# Patient Record
Sex: MALE | Race: WHITE | Employment: STUDENT | URBAN - METROPOLITAN AREA
[De-identification: names, ages, dates, MRNs, and addresses within clinical notes are randomized per-mention and may not be internally consistent; named-entity substitution may affect disease eponyms.]

---

## 2019-02-08 ENCOUNTER — HOSPITAL ENCOUNTER (OUTPATIENT)
Dept: GENERAL RADIOLOGY | Age: 19
Discharge: HOME | End: 2019-02-08
Attending: INTERNAL MEDICINE
Payer: COMMERCIAL

## 2019-02-08 DIAGNOSIS — K80.20 GALLSTONE: ICD-10-CM

## 2019-02-08 PROCEDURE — 74011000255 HC RX REV CODE- 255

## 2019-02-08 PROCEDURE — 74247 XR UPPER GI W KUB AIR CONT: CPT

## 2019-02-08 PROCEDURE — 74011000250 HC RX REV CODE- 250

## 2019-02-08 RX ADMIN — BARIUM SULFATE 135 ML: 980 POWDER, FOR SUSPENSION ORAL at 07:23

## 2019-02-08 RX ADMIN — BARIUM SULFATE 40 G: 960 POWDER, FOR SUSPENSION ORAL at 07:23

## 2019-02-08 RX ADMIN — ANTACID/ANTIFLATULENT 4 G: 380; 550; 10; 10 GRANULE, EFFERVESCENT ORAL at 07:23

## 2019-03-12 ENCOUNTER — OFFICE VISIT (OUTPATIENT)
Dept: SURGERY | Age: 19
End: 2019-03-12

## 2019-03-12 VITALS
TEMPERATURE: 97.9 F | BODY MASS INDEX: 18.42 KG/M2 | SYSTOLIC BLOOD PRESSURE: 110 MMHG | HEIGHT: 72 IN | DIASTOLIC BLOOD PRESSURE: 72 MMHG | HEART RATE: 89 BPM | WEIGHT: 136 LBS | OXYGEN SATURATION: 99 % | RESPIRATION RATE: 18 BRPM

## 2019-03-12 DIAGNOSIS — K80.20 GALLSTONES: Primary | ICD-10-CM

## 2019-03-12 RX ORDER — SODIUM CHLORIDE 0.9 % (FLUSH) 0.9 %
5-40 SYRINGE (ML) INJECTION EVERY 8 HOURS
Status: CANCELLED | OUTPATIENT
Start: 2019-03-12

## 2019-03-12 RX ORDER — SODIUM CHLORIDE 0.9 % (FLUSH) 0.9 %
5-40 SYRINGE (ML) INJECTION AS NEEDED
Status: CANCELLED | OUTPATIENT
Start: 2019-03-12

## 2019-03-12 NOTE — PROGRESS NOTES
HISTORY OF PRESENT ILLNESS  Beckie Daniels is a 20 y.o. male. Abdominal Pain   Associated symptoms include abdominal pain. Review of Systems   Constitutional: Negative. HENT: Negative. Eyes: Negative. Respiratory: Negative. Cardiovascular: Negative. Gastrointestinal: Positive for abdominal pain. Negative for blood in stool, constipation, diarrhea, heartburn, melena, nausea and vomiting. Genitourinary: Negative. Musculoskeletal: Negative. Skin: Negative. Neurological: Negative. Endo/Heme/Allergies: Negative. Psychiatric/Behavioral: Negative.         Physical Exam

## 2019-03-12 NOTE — H&P (VIEW-ONLY)
[facility] General Surgery Subjective: HPI: Patient is a very pleasant 24-year-old male with a past medical history remarkable for spherocytosis. He is referred by Dr. Guanako Webber for evaluation and management of cholelithiasis. The patient is accompanied by a female relationship unknown. She helps to give most of the history. She states that the patient has a hematologist that [address] [place] [address] he sees every year for evaluation of the spleen for possible splenomegaly. He last saw this provider in November. To the best of their knowledge there is no splenomegaly. Patient states that for approximately 2 months he experienced postprandial abdominal pain in the epigastrium and right upper quadrant with eating fatty foods. He has changed his diet to remove the fatty food. The pain is now a discomfort. There are no active problems to display for this patient. No past medical history on file. No past surgical history on file. Family History Problem Relation Age of Onset Eduarda Vicente Bladder Disease Father Social History Tobacco Use  Smoking status: Never Smoker  Smokeless tobacco: Never Used Substance Use Topics  Alcohol use: Not on file Not on File Prior to Admission medications Not on File Review of Systems:   
14 systems were reviewed. The results are as above in the HPI and otherwise negative. Objective:  
 
Vitals:  
 03/12/19 0841 BP: 110/72 Pulse: 89 Resp: 18 Temp: 97.9 °F (36.6 °C) TempSrc: Oral  
SpO2: 99% Weight: 61.7 kg (136 lb) Height: 6' (1.829 m) Physical Exam: 
GENERAL: alert, cooperative, no distress, appears stated age, EYE: conjunctivae/corneas clear. PERRL, EOM's intact.  
THROAT & NECK: normal and no erythema or exudates noted. ,   
LYMPHATIC: Cervical, supraclavicular, and axillary nodes normal. ,  
LUNG: clear to auscultation bilaterally,  
 HEART: regular rate and rhythm, S1, S2 normal, no murmur, click, rub or gallop, ABDOMEN: soft, non-tender. Bowel sounds normal. No masses,  no organomegaly, EXTREMITIES:  extremities normal, atraumatic, no cyanosis or edema, SKIN: Normal., NEUROLOGIC: AOx3. Cranial nerves 2-12 and sensation grossly intact. ,  
 
Data Review:  to be done Mr. Juliet Canavan has a reminder for a \"due or due soon\" health maintenance. I have asked that he contact his primary care provider for follow-up on this health maintenance. Impression: · Patient with gallstones and likely chronic calculus cholecystitis. Plan:  
 
· Single site robot-assisted laparoscopic cholecystectomy · Consent on chart · Preoperative orders written Signed By: Kathryn Harris MD   
 March 12, 2019

## 2019-03-12 NOTE — PROGRESS NOTES
[facility]  General Surgery    Subjective:      HPI: Patient is a very pleasant 62-year-old male with a past medical history remarkable for spherocytosis. He is referred by Dr. Sonia Martínez for evaluation and management of cholelithiasis. The patient is accompanied by a female relationship unknown. She helps to give most of the history. She states that the patient has a hematologist that [address] [place] [address] he sees every year for evaluation of the spleen for possible splenomegaly. He last saw this provider in November. To the best of their knowledge there is no splenomegaly. Patient states that for approximately 2 months he experienced postprandial abdominal pain in the epigastrium and right upper quadrant with eating fatty foods. He has changed his diet to remove the fatty food. The pain is now a discomfort. There are no active problems to display for this patient. No past medical history on file. No past surgical history on file. Family History   Problem Relation Age of Onset    Gall Bladder Disease Father       Social History     Tobacco Use    Smoking status: Never Smoker    Smokeless tobacco: Never Used   Substance Use Topics    Alcohol use: Not on file      Not on File    Prior to Admission medications    Not on File       Review of Systems:    14 systems were reviewed. The results are as above in the HPI and otherwise negative. Objective:     Vitals:    03/12/19 0841   BP: 110/72   Pulse: 89   Resp: 18   Temp: 97.9 °F (36.6 °C)   TempSrc: Oral   SpO2: 99%   Weight: 61.7 kg (136 lb)   Height: 6' (1.829 m)       Physical Exam:  GENERAL: alert, cooperative, no distress, appears stated age,   EYE: conjunctivae/corneas clear. PERRL, EOM's intact.   THROAT & NECK: normal and no erythema or exudates noted. ,    LYMPHATIC: Cervical, supraclavicular, and axillary nodes normal. ,   LUNG: clear to auscultation bilaterally,   HEART: regular rate and rhythm, S1, S2 normal, no murmur, click, rub or gallop,   ABDOMEN: soft, non-tender. Bowel sounds normal. No masses,  no organomegaly,   EXTREMITIES:  extremities normal, atraumatic, no cyanosis or edema,   SKIN: Normal.,   NEUROLOGIC: AOx3. Cranial nerves 2-12 and sensation grossly intact. ,     Data Review:  to be done    Mr. Radhika Rodgers has a reminder for a \"due or due soon\" health maintenance. I have asked that he contact his primary care provider for follow-up on this health maintenance. Impression:     · Patient with gallstones and likely chronic calculus cholecystitis.     Plan:     · Single site robot-assisted laparoscopic cholecystectomy  · Consent on chart  · Preoperative orders written    Signed By: Sonam Cruz MD     March 12, 2019

## 2019-03-14 ENCOUNTER — HOSPITAL ENCOUNTER (OUTPATIENT)
Dept: LAB | Age: 19
End: 2019-03-14
Payer: COMMERCIAL

## 2019-03-14 ENCOUNTER — HOSPITAL ENCOUNTER (OUTPATIENT)
Dept: GENERAL RADIOLOGY | Age: 19
Discharge: HOME | End: 2019-03-14
Payer: COMMERCIAL

## 2019-03-14 ENCOUNTER — HOSPITAL ENCOUNTER (OUTPATIENT)
Dept: PREADMISSION TESTING | Age: 19
Discharge: HOME | End: 2019-03-14
Payer: COMMERCIAL

## 2019-03-14 DIAGNOSIS — K80.20 GALLSTONES: ICD-10-CM

## 2019-03-14 LAB
ANION GAP SERPL CALC-SCNC: 9 MMOL/L (ref 3–18)
BASOPHILS # BLD: 0 K/UL (ref 0–0.06)
BASOPHILS NFR BLD: 0 % (ref 0–3)
BUN SERPL-MCNC: 15 MG/DL (ref 7–18)
BUN/CREAT SERPL: 19 (ref 12–20)
CALCIUM SERPL-MCNC: 9.3 MG/DL (ref 8.5–10.1)
CHLORIDE SERPL-SCNC: 106 MMOL/L (ref 100–108)
CO2 SERPL-SCNC: 27 MMOL/L (ref 21–32)
CREAT SERPL-MCNC: 0.8 MG/DL (ref 0.6–1.3)
DIFFERENTIAL METHOD BLD: ABNORMAL
EOSINOPHIL # BLD: 0 K/UL (ref 0–0.4)
EOSINOPHIL NFR BLD: 0 % (ref 0–5)
ERYTHROCYTE [DISTWIDTH] IN BLOOD BY AUTOMATED COUNT: 18.1 % (ref 11.6–14.5)
GLUCOSE SERPL-MCNC: 82 MG/DL (ref 74–99)
HCT VFR BLD AUTO: 37.6 % (ref 36–48)
HGB BLD-MCNC: 13.2 G/DL (ref 13–16)
LYMPHOCYTES # BLD: 1.8 K/UL (ref 0.8–3.5)
LYMPHOCYTES NFR BLD: 9 % (ref 20–51)
MCH RBC QN AUTO: 30.6 PG (ref 24–34)
MCHC RBC AUTO-ENTMCNC: 35.1 G/DL (ref 31–37)
MCV RBC AUTO: 87 FL (ref 74–97)
MONOCYTES # BLD: 1 K/UL (ref 0–1)
MONOCYTES NFR BLD: 5 % (ref 2–9)
NEUTS BAND NFR BLD MANUAL: 1 % (ref 0–5)
NEUTS SEG # BLD: 17.5 K/UL (ref 1.8–8)
NEUTS SEG NFR BLD: 85 % (ref 42–75)
PLATELET # BLD AUTO: 247 K/UL (ref 135–420)
PLATELET COMMENTS,PCOM: ABNORMAL
PMV BLD AUTO: 9.5 FL (ref 9.2–11.8)
POTASSIUM SERPL-SCNC: 4 MMOL/L (ref 3.5–5.5)
RBC # BLD AUTO: 4.32 M/UL (ref 4.7–5.5)
RBC MORPH BLD: ABNORMAL
SODIUM SERPL-SCNC: 142 MMOL/L (ref 136–145)
WBC # BLD AUTO: 20.3 K/UL (ref 4.6–13.2)

## 2019-03-14 PROCEDURE — 71046 X-RAY EXAM CHEST 2 VIEWS: CPT

## 2019-03-14 PROCEDURE — 80048 BASIC METABOLIC PNL TOTAL CA: CPT

## 2019-03-14 PROCEDURE — 85025 COMPLETE CBC W/AUTO DIFF WBC: CPT

## 2019-03-14 PROCEDURE — 36415 COLL VENOUS BLD VENIPUNCTURE: CPT

## 2019-03-20 ENCOUNTER — ANESTHESIA EVENT (OUTPATIENT)
Dept: SURGERY | Age: 19
End: 2019-03-20
Payer: COMMERCIAL

## 2019-03-21 ENCOUNTER — HOSPITAL ENCOUNTER (OUTPATIENT)
Age: 19
Discharge: HOME | End: 2019-03-21
Attending: SURGERY | Admitting: SURGERY
Payer: COMMERCIAL

## 2019-03-21 ENCOUNTER — ANESTHESIA (OUTPATIENT)
Dept: SURGERY | Age: 19
End: 2019-03-21
Payer: COMMERCIAL

## 2019-03-21 VITALS
DIASTOLIC BLOOD PRESSURE: 63 MMHG | TEMPERATURE: 97.9 F | HEART RATE: 75 BPM | OXYGEN SATURATION: 100 % | HEIGHT: 74 IN | SYSTOLIC BLOOD PRESSURE: 108 MMHG | BODY MASS INDEX: 18.1 KG/M2 | RESPIRATION RATE: 14 BRPM | WEIGHT: 141 LBS

## 2019-03-21 DIAGNOSIS — G89.18 POSTOPERATIVE PAIN: Primary | ICD-10-CM

## 2019-03-21 PROBLEM — K80.20 GALLSTONES: Status: ACTIVE | Noted: 2019-03-21

## 2019-03-21 PROCEDURE — 74011000250 HC RX REV CODE- 250: Performed by: SURGERY

## 2019-03-21 PROCEDURE — 74011250636 HC RX REV CODE- 250/636: Performed by: NURSE ANESTHETIST, CERTIFIED REGISTERED

## 2019-03-21 PROCEDURE — 77030008756 HC TU IRR SUC STRY -B: Performed by: SURGERY

## 2019-03-21 PROCEDURE — 74011000258 HC RX REV CODE- 258

## 2019-03-21 PROCEDURE — 74011000250 HC RX REV CODE- 250

## 2019-03-21 PROCEDURE — 76210000021 HC REC RM PH II 0.5 TO 1 HR: Performed by: SURGERY

## 2019-03-21 PROCEDURE — 77030020782 HC GWN BAIR PAWS FLX 3M -B: Performed by: SURGERY

## 2019-03-21 PROCEDURE — 77030003028 HC SUT VCRL J&J -A: Performed by: SURGERY

## 2019-03-21 PROCEDURE — 76060000035 HC ANESTHESIA 2 TO 2.5 HR: Performed by: SURGERY

## 2019-03-21 PROCEDURE — 76210000016 HC OR PH I REC 1 TO 1.5 HR: Performed by: SURGERY

## 2019-03-21 PROCEDURE — 77030029216 HC PRT SGL SITE DAVINCI INTU -C: Performed by: SURGERY

## 2019-03-21 PROCEDURE — 74011250636 HC RX REV CODE- 250/636

## 2019-03-21 PROCEDURE — 77030031139 HC SUT VCRL2 J&J -A: Performed by: SURGERY

## 2019-03-21 PROCEDURE — 77030018390 HC SPNG HEMSTAT2 J&J -B: Performed by: SURGERY

## 2019-03-21 PROCEDURE — 88304 TISSUE EXAM BY PATHOLOGIST: CPT

## 2019-03-21 PROCEDURE — 77030020703 HC SEAL CANN DISP INTU -B: Performed by: SURGERY

## 2019-03-21 PROCEDURE — 74011000250 HC RX REV CODE- 250: Performed by: NURSE ANESTHETIST, CERTIFIED REGISTERED

## 2019-03-21 PROCEDURE — 77030020268 HC MISC GENERAL SUPPLY: Performed by: SURGERY

## 2019-03-21 PROCEDURE — 77030018836 HC SOL IRR NACL ICUM -A: Performed by: SURGERY

## 2019-03-21 PROCEDURE — 77030002966 HC SUT PDS J&J -A: Performed by: SURGERY

## 2019-03-21 PROCEDURE — 77030013079 HC BLNKT BAIR HGGR 3M -A: Performed by: ANESTHESIOLOGY

## 2019-03-21 PROCEDURE — 77030002933 HC SUT MCRYL J&J -A: Performed by: SURGERY

## 2019-03-21 PROCEDURE — 74011000254 HC RX REV CODE- 254: Performed by: SURGERY

## 2019-03-21 PROCEDURE — 76010000876 HC OR TIME 2 TO 2.5HR INTENSV - TIER 2: Performed by: SURGERY

## 2019-03-21 PROCEDURE — 77030008683 HC TU ET CUF COVD -A: Performed by: ANESTHESIOLOGY

## 2019-03-21 PROCEDURE — 77030010939 HC CLP LIG TELE -B: Performed by: SURGERY

## 2019-03-21 PROCEDURE — 74011250637 HC RX REV CODE- 250/637: Performed by: SURGERY

## 2019-03-21 PROCEDURE — 77030035488 HC SEAL UNIV DISP INTU -C: Performed by: SURGERY

## 2019-03-21 RX ORDER — SODIUM CHLORIDE 0.9 % (FLUSH) 0.9 %
5-40 SYRINGE (ML) INJECTION EVERY 8 HOURS
Status: DISCONTINUED | OUTPATIENT
Start: 2019-03-21 | End: 2019-03-21 | Stop reason: HOSPADM

## 2019-03-21 RX ORDER — OXYCODONE AND ACETAMINOPHEN 5; 325 MG/1; MG/1
1 TABLET ORAL ONCE
Status: COMPLETED | OUTPATIENT
Start: 2019-03-21 | End: 2019-03-21

## 2019-03-21 RX ORDER — PROMETHAZINE HYDROCHLORIDE 25 MG/1
25 TABLET ORAL
Qty: 10 TAB | Refills: 0 | Status: SHIPPED | OUTPATIENT
Start: 2019-03-21

## 2019-03-21 RX ORDER — ONDANSETRON 2 MG/ML
4 INJECTION INTRAMUSCULAR; INTRAVENOUS ONCE
Status: DISCONTINUED | OUTPATIENT
Start: 2019-03-21 | End: 2019-03-21 | Stop reason: HOSPADM

## 2019-03-21 RX ORDER — PROMETHAZINE HYDROCHLORIDE 25 MG/ML
12.5 INJECTION, SOLUTION INTRAMUSCULAR; INTRAVENOUS
Status: COMPLETED | OUTPATIENT
Start: 2019-03-21 | End: 2019-03-21

## 2019-03-21 RX ORDER — LIDOCAINE HYDROCHLORIDE 20 MG/ML
INJECTION, SOLUTION EPIDURAL; INFILTRATION; INTRACAUDAL; PERINEURAL AS NEEDED
Status: DISCONTINUED | OUTPATIENT
Start: 2019-03-21 | End: 2019-03-21 | Stop reason: HOSPADM

## 2019-03-21 RX ORDER — SUCCINYLCHOLINE CHLORIDE 20 MG/ML
INJECTION INTRAMUSCULAR; INTRAVENOUS AS NEEDED
Status: DISCONTINUED | OUTPATIENT
Start: 2019-03-21 | End: 2019-03-21 | Stop reason: HOSPADM

## 2019-03-21 RX ORDER — ROCURONIUM BROMIDE 10 MG/ML
INJECTION, SOLUTION INTRAVENOUS AS NEEDED
Status: DISCONTINUED | OUTPATIENT
Start: 2019-03-21 | End: 2019-03-21 | Stop reason: HOSPADM

## 2019-03-21 RX ORDER — MIDAZOLAM HYDROCHLORIDE 1 MG/ML
INJECTION, SOLUTION INTRAMUSCULAR; INTRAVENOUS AS NEEDED
Status: DISCONTINUED | OUTPATIENT
Start: 2019-03-21 | End: 2019-03-21 | Stop reason: HOSPADM

## 2019-03-21 RX ORDER — PROPOFOL 10 MG/ML
INJECTION, EMULSION INTRAVENOUS AS NEEDED
Status: DISCONTINUED | OUTPATIENT
Start: 2019-03-21 | End: 2019-03-21 | Stop reason: HOSPADM

## 2019-03-21 RX ORDER — ONDANSETRON 2 MG/ML
INJECTION INTRAMUSCULAR; INTRAVENOUS AS NEEDED
Status: DISCONTINUED | OUTPATIENT
Start: 2019-03-21 | End: 2019-03-21 | Stop reason: HOSPADM

## 2019-03-21 RX ORDER — SODIUM CHLORIDE 0.9 % (FLUSH) 0.9 %
5-40 SYRINGE (ML) INJECTION AS NEEDED
Status: DISCONTINUED | OUTPATIENT
Start: 2019-03-21 | End: 2019-03-21 | Stop reason: HOSPADM

## 2019-03-21 RX ORDER — SODIUM CHLORIDE, SODIUM LACTATE, POTASSIUM CHLORIDE, CALCIUM CHLORIDE 600; 310; 30; 20 MG/100ML; MG/100ML; MG/100ML; MG/100ML
75 INJECTION, SOLUTION INTRAVENOUS CONTINUOUS
Status: DISCONTINUED | OUTPATIENT
Start: 2019-03-21 | End: 2019-03-21 | Stop reason: HOSPADM

## 2019-03-21 RX ORDER — HYDROMORPHONE HYDROCHLORIDE 2 MG/ML
0.5 INJECTION, SOLUTION INTRAMUSCULAR; INTRAVENOUS; SUBCUTANEOUS
Status: DISCONTINUED | OUTPATIENT
Start: 2019-03-21 | End: 2019-03-21 | Stop reason: HOSPADM

## 2019-03-21 RX ORDER — LIDOCAINE HYDROCHLORIDE 10 MG/ML
0.1 INJECTION, SOLUTION EPIDURAL; INFILTRATION; INTRACAUDAL; PERINEURAL AS NEEDED
Status: DISCONTINUED | OUTPATIENT
Start: 2019-03-21 | End: 2019-03-21 | Stop reason: HOSPADM

## 2019-03-21 RX ORDER — OXYCODONE AND ACETAMINOPHEN 5; 325 MG/1; MG/1
1 TABLET ORAL ONCE
Status: CANCELLED | OUTPATIENT
Start: 2019-03-21 | End: 2019-03-22

## 2019-03-21 RX ORDER — INSULIN LISPRO 100 [IU]/ML
INJECTION, SOLUTION INTRAVENOUS; SUBCUTANEOUS ONCE
Status: DISCONTINUED | OUTPATIENT
Start: 2019-03-21 | End: 2019-03-21 | Stop reason: HOSPADM

## 2019-03-21 RX ORDER — INDOCYANINE GREEN AND WATER 25 MG
3 KIT INJECTION
Status: COMPLETED | OUTPATIENT
Start: 2019-03-21 | End: 2019-03-21

## 2019-03-21 RX ORDER — KETOROLAC TROMETHAMINE 30 MG/ML
INJECTION, SOLUTION INTRAMUSCULAR; INTRAVENOUS AS NEEDED
Status: DISCONTINUED | OUTPATIENT
Start: 2019-03-21 | End: 2019-03-21 | Stop reason: HOSPADM

## 2019-03-21 RX ORDER — DEXAMETHASONE SODIUM PHOSPHATE 4 MG/ML
INJECTION, SOLUTION INTRA-ARTICULAR; INTRALESIONAL; INTRAMUSCULAR; INTRAVENOUS; SOFT TISSUE AS NEEDED
Status: DISCONTINUED | OUTPATIENT
Start: 2019-03-21 | End: 2019-03-21 | Stop reason: HOSPADM

## 2019-03-21 RX ORDER — FENTANYL CITRATE 50 UG/ML
INJECTION, SOLUTION INTRAMUSCULAR; INTRAVENOUS AS NEEDED
Status: DISCONTINUED | OUTPATIENT
Start: 2019-03-21 | End: 2019-03-21 | Stop reason: HOSPADM

## 2019-03-21 RX ORDER — BUPIVACAINE HYDROCHLORIDE AND EPINEPHRINE 5; 5 MG/ML; UG/ML
INJECTION, SOLUTION EPIDURAL; INTRACAUDAL; PERINEURAL AS NEEDED
Status: DISCONTINUED | OUTPATIENT
Start: 2019-03-21 | End: 2019-03-21 | Stop reason: HOSPADM

## 2019-03-21 RX ORDER — FENTANYL CITRATE 50 UG/ML
25 INJECTION, SOLUTION INTRAMUSCULAR; INTRAVENOUS AS NEEDED
Status: DISCONTINUED | OUTPATIENT
Start: 2019-03-21 | End: 2019-03-21 | Stop reason: HOSPADM

## 2019-03-21 RX ORDER — NEOSTIGMINE METHYLSULFATE 5 MG/5 ML
SYRINGE (ML) INTRAVENOUS AS NEEDED
Status: DISCONTINUED | OUTPATIENT
Start: 2019-03-21 | End: 2019-03-21 | Stop reason: HOSPADM

## 2019-03-21 RX ORDER — DEXMEDETOMIDINE HYDROCHLORIDE 4 UG/ML
INJECTION, SOLUTION INTRAVENOUS AS NEEDED
Status: DISCONTINUED | OUTPATIENT
Start: 2019-03-21 | End: 2019-03-21 | Stop reason: HOSPADM

## 2019-03-21 RX ORDER — GLYCOPYRROLATE 0.2 MG/ML
INJECTION INTRAMUSCULAR; INTRAVENOUS AS NEEDED
Status: DISCONTINUED | OUTPATIENT
Start: 2019-03-21 | End: 2019-03-21 | Stop reason: HOSPADM

## 2019-03-21 RX ORDER — OXYCODONE AND ACETAMINOPHEN 5; 325 MG/1; MG/1
1 TABLET ORAL
Qty: 20 TAB | Refills: 0 | Status: SHIPPED | OUTPATIENT
Start: 2019-03-21 | End: 2019-03-28

## 2019-03-21 RX ADMIN — LIDOCAINE HYDROCHLORIDE 100 MG: 20 INJECTION, SOLUTION EPIDURAL; INFILTRATION; INTRACAUDAL; PERINEURAL at 12:14

## 2019-03-21 RX ADMIN — DEXMEDETOMIDINE HYDROCHLORIDE 8 MCG: 4 INJECTION, SOLUTION INTRAVENOUS at 12:24

## 2019-03-21 RX ADMIN — Medication 3 MG: at 14:00

## 2019-03-21 RX ADMIN — DEXAMETHASONE SODIUM PHOSPHATE 4 MG: 4 INJECTION, SOLUTION INTRA-ARTICULAR; INTRALESIONAL; INTRAMUSCULAR; INTRAVENOUS; SOFT TISSUE at 12:24

## 2019-03-21 RX ADMIN — DEXMEDETOMIDINE HYDROCHLORIDE 4 MCG: 4 INJECTION, SOLUTION INTRAVENOUS at 12:45

## 2019-03-21 RX ADMIN — SUCCINYLCHOLINE CHLORIDE 120 MG: 20 INJECTION INTRAMUSCULAR; INTRAVENOUS at 12:14

## 2019-03-21 RX ADMIN — MIDAZOLAM HYDROCHLORIDE 2 MG: 1 INJECTION, SOLUTION INTRAMUSCULAR; INTRAVENOUS at 12:10

## 2019-03-21 RX ADMIN — CEFOTETAN DISODIUM 2 G: 2 INJECTION, POWDER, FOR SOLUTION INTRAMUSCULAR; INTRAVENOUS at 12:10

## 2019-03-21 RX ADMIN — ROCURONIUM BROMIDE 10 MG: 10 INJECTION, SOLUTION INTRAVENOUS at 13:34

## 2019-03-21 RX ADMIN — FENTANYL CITRATE 100 MCG: 50 INJECTION, SOLUTION INTRAMUSCULAR; INTRAVENOUS at 12:14

## 2019-03-21 RX ADMIN — FAMOTIDINE 20 MG: 10 INJECTION INTRAVENOUS at 11:43

## 2019-03-21 RX ADMIN — ONDANSETRON 4 MG: 2 INJECTION INTRAMUSCULAR; INTRAVENOUS at 12:24

## 2019-03-21 RX ADMIN — DEXMEDETOMIDINE HYDROCHLORIDE 8 MCG: 4 INJECTION, SOLUTION INTRAVENOUS at 12:38

## 2019-03-21 RX ADMIN — GLYCOPYRROLATE 0.4 MG: 0.2 INJECTION INTRAMUSCULAR; INTRAVENOUS at 14:00

## 2019-03-21 RX ADMIN — DEXMEDETOMIDINE HYDROCHLORIDE 4 MCG: 4 INJECTION, SOLUTION INTRAVENOUS at 14:06

## 2019-03-21 RX ADMIN — KETOROLAC TROMETHAMINE 30 MG: 30 INJECTION, SOLUTION INTRAMUSCULAR; INTRAVENOUS at 12:16

## 2019-03-21 RX ADMIN — DEXMEDETOMIDINE HYDROCHLORIDE 8 MCG: 4 INJECTION, SOLUTION INTRAVENOUS at 12:56

## 2019-03-21 RX ADMIN — SODIUM CHLORIDE, SODIUM LACTATE, POTASSIUM CHLORIDE, AND CALCIUM CHLORIDE 75 ML/HR: 600; 310; 30; 20 INJECTION, SOLUTION INTRAVENOUS at 11:33

## 2019-03-21 RX ADMIN — PROMETHAZINE HYDROCHLORIDE 12.5 MG: 25 INJECTION INTRAMUSCULAR; INTRAVENOUS at 14:30

## 2019-03-21 RX ADMIN — ROCURONIUM BROMIDE 20 MG: 10 INJECTION, SOLUTION INTRAVENOUS at 12:27

## 2019-03-21 RX ADMIN — OXYCODONE AND ACETAMINOPHEN 1 TABLET: 5; 325 TABLET ORAL at 16:08

## 2019-03-21 RX ADMIN — DEXMEDETOMIDINE HYDROCHLORIDE 6 MCG: 4 INJECTION, SOLUTION INTRAVENOUS at 12:31

## 2019-03-21 RX ADMIN — INDOCYANINE GREEN AND WATER 25 MG: KIT at 11:32

## 2019-03-21 RX ADMIN — DEXMEDETOMIDINE HYDROCHLORIDE 6 MCG: 4 INJECTION, SOLUTION INTRAVENOUS at 12:26

## 2019-03-21 RX ADMIN — PROPOFOL 80 MG: 10 INJECTION, EMULSION INTRAVENOUS at 12:14

## 2019-03-21 RX ADMIN — DEXMEDETOMIDINE HYDROCHLORIDE 8 MCG: 4 INJECTION, SOLUTION INTRAVENOUS at 12:41

## 2019-03-21 NOTE — OP NOTES
[address] [place] [address]                                [address] [facility], [address]                                 OPERATIVE REPORT    PATIENT:    Jyoti Lord  MRN:           [id number]   DATE:   3/21/2019  BILLIN  ROOM:       [facility]  ATTENDING:   Faisal Powers MD  DICTATING:   Faisal Powers MD      PREOPERATIVE DIAGNOSIS:  Chronic calculus cholecystitis    POSTOPERATIVE DIAGNOSIS: same    PROCEDURES PERFORMED: Single site robotic assisted laparoscopic cholecystectomy    SURGEON: Pierre Cavazos MD    ASSISTANT: Marily Maza SA    ANESTHESIA: General and local 0.50% Marcaine with epinephrine. FINDINGS: cholelithiasis    EBL: 30 mL    Fluids:  600 mL    DESCRIPTION OF PROCEDURE: The patient was identified in the  holding area where consent for single site robot-assisted  laparoscopic cholecystectomy was verified. In the operating room, the  patient was placed under general anesthesia, supine on the OR table. The abdomen was prepped and draped in sterile fashion using  chlorhexidine solution and sterile drapes. The time-out was performed  to ensure correct procedure. Local anesthetic was infiltrated into the  skin and deep dermal tissues in the umbilicus. The 2.5 cm incision was  created through the umbilicus using the 11 blade. The electrocautery  was used to dissect down to expose the fascia. The fascia was opened  using electrocautery. The peritoneum was bluntly opened using the  Salena Jansky clamp. The fascia was opened for the 2.5 cm length of the  incision. The single site trocar was inserted into the incision and seated  against the anterior abdominal wall. The pneumoperitoneum was  obtained using carbon dioxide gas to 15 mmHg. The camera trocar  was then inserted. The site check was then performed to ensure that  this was a surgery that we could do with single site. No  contraindications were visualized.  The endoscopic left trocar was then  inserted and docked. The endoscopic right trocar was then inserted  and docked. The assistant trocar was inserted. The system was  burped to take pressure off of the trocar. The scope was turned to a 30  down. The hook was inserted into the endoscopic left trocar #3 and  the Prograf was introduced into the endoscopic right trocar #1. The  patient had been positioned in reverse Trendelenburg with the right  side up prior to docking the camera. The gallbladder was grasped and  retracted toward the patient's right shoulder to open the triangle of  Calot. The gallbladder was intrahepatic. The dissection was very difficult. The crocodile grasper and the hook were used to dissect the  fatty tissue off of the anterior wall of the gallbladder and allow  visualization of the infundibulum cystic duct junction. Indocyanine  green had been given preoperatively. The Firefly was used to visualize  the cystic duct. The cystic duct was dissected free circumferentially  and clipped and ligated using scissors and the medium-size clips. The  cystic artery was then identified, isolated and clipped and ligated. The  gallbladder was removed from the gallbladder fossa using  electrocautery. A 4 x 8 gauze was used to soak up bile and blood. The No leaking was noted at the clips. No bleeding was  noted at the gallbladder fossa or artery site. The surgical cart was  undocked. The port was removed. A 5 mm Endocatch was used to contain the gallbladder and stones. The 4 x 8 gauze was removed. The fascia was then reapproximated using figure-of-  eight stitches. #1 single-stranded PDS x3. The skin was  reapproximated using 4-0 Monocryl suture. Mastisol, steristrips, gauze and tegaderm. The patient tolerated the procedure very well. DISPOSITION: He was extubated and stable upon transport to the recovery  room.       Libertad Gifford MD, FACS

## 2019-03-21 NOTE — INTERVAL H&P NOTE
H&P Update: Sharath Arreguin was seen and examined. History and physical has been reviewed. The patient has been examined.  There have been no significant clinical changes since the completion of the originally dated History and Physical. 
 
Signed By: Flakita Orantes MD   
 March 21, 2019 11:24 AM

## 2019-03-21 NOTE — ANESTHESIA POSTPROCEDURE EVALUATION
Procedure(s): 
ROBOTIC ASSISTED SINGLE SITE LAPAROSCOPIC CHOLECYSTECTOMY WITH FIREFLY. general 
 
Anesthesia Post Evaluation Multimodal analgesia: multimodal analgesia used between 6 hours prior to anesthesia start to PACU discharge Patient location during evaluation: bedside Patient participation: complete - patient participated Level of consciousness: awake Pain management: adequate Airway patency: patent Anesthetic complications: no 
Cardiovascular status: acceptable Respiratory status: acceptable Hydration status: acceptable Post anesthesia nausea and vomiting:  controlled (Phenergan for N/V) Vitals Value Taken Time /50 3/21/2019  3:21 PM  
Temp 37.1 °C (98.8 °F) 3/21/2019  2:22 PM  
Pulse 69 3/21/2019  3:27 PM  
Resp 16 3/21/2019  3:27 PM  
SpO2 98 % 3/21/2019  3:27 PM  
Vitals shown include unvalidated device data.

## 2019-03-21 NOTE — ADDENDUM NOTE
Addendum  created 03/21/19 1553 by Servando Schuster CRNA Intraprocedure Meds edited, Orders acknowledged in Narrator

## 2019-03-21 NOTE — DISCHARGE INSTRUCTIONS
[facility] Specialists  Temitope Suarez MD, [facility]  General Surgery    Pt may remove the dressing and shower in two days. Allow soap and water to run over the incision. Please apply an ice pack to the umbilicus for 25 minutes three times daily for 7 days. No driving or operating heavy machinery while on narcotic pain medications. No strenuous activity or contact sports for two weeks. No lifting greater than 15 lbs for 2 weeks. Call MD for any redness, swelling, bleeding or pus at the incision. Also call for any nausea, vomiting, increased pain or pain uncontrolled by pain medicine. Low-Fat Diet for Gallbladder Disease: After Your Visit  Your Care Instructions  When you eat, the gallbladder releases bile, which helps you digest the fat in food. If you have an inflamed gallbladder, this may cause pain. A low-fat diet may give your gallbladder a rest so you can start to heal. Your doctor and dietitian can help you make an eating plan that does not irritate your digestive system. Always talk with your doctor or dietitian before you make changes in your diet. Follow-up care is a key part of your treatment and safety. Be sure to make and go to all appointments, and call your doctor if you are having problems. Its also a good idea to know your test results and keep a list of the medicines you take. How can you care for yourself at home? · Eat many small meals and snacks each day instead of three large meals. · Choose lean meats. ¨ Eat no more than 5 to 6½ ounces of meat a day. ¨ Cut off all fat you can see. ¨ Eat chicken and turkey without the skin. ¨ Many types of fish, such as salmon, lake trout, tuna, and herring, provide healthy omega-3 fat. But, avoid fish canned in oil, such as sardines in olive oil. ¨ Bake, broil, or grill meats, fowl, or fish instead of frying them in butter or fat.   · Drink or eat nonfat or low-fat milk, yogurt, cheese, or other milk products each day.  ¨ Read the labels on cheeses, and choose those with less than 5 grams of fat an ounce. ¨ Try fat-free sour cream, cream cheese, or yogurt. ¨ Avoid cream soups and cream sauces on pasta. ¨ Eat low-fat ice cream, frozen yogurt, or sorbet. Avoid regular ice cream.  · Eat whole-grain cereals, breads, crackers, rice, or pasta. Avoid high-fat foods such as croissants, scones, biscuits, waffles, doughnuts, muffins, granola, and high-fat breads. · Flavor your foods with herbs and spices (such as basil, tarragon, or mint), fat-free sauces, or lemon juice instead of butter. You can also use butter substitutes, fat-free mayonnaise, or fat-free dressing. · Try applesauce, prune puree, or mashed bananas to replace some or all of the fat when you bake. · Limit fats and oils, such as butter, margarine, mayonnaise, and salad dressing, to no more than 1 tablespoon a meal.  · Avoid high-fat foods, such as:  ¨ Chocolate, whole milk, ice cream, processed cheese, and egg yolks. ¨ Fried or buttered foods. ¨ Ham, salami, and wade. ¨ Cinnamon rolls, cakes, pies, cookies, and other pastries. ¨ Prepared snack foods, such as potato chips, nut and granola bars, and mixed nuts. ¨ Coconut and avocado. · Learn how to read food labels for serving sizes and ingredients. Fast-food and convenience-food meals often have lots of fat. Where can you learn more? Go to VerticalResponse.be  Enter A203 in the search box to learn more about \"Low-Fat Diet for Gallbladder Disease: After Your Visit. \"   © 6533-5813 [employer]. Care instructions adapted under license by [facility] (which disclaims liability or warranty for this information).  This care instruction is for use with your licensed healthcare professional. If you have questions about a medical condition or this instruction, always ask your healthcare professional. [place] [address] any warranty or liability for your use of this information. Content Version: 4.9.363829; Last Revised: August 31, 2011    Cholecystectomy: What to Expect at [facility]  After your surgery, it is normal to feel weak and tired for several days after you return home. Your belly may be swollen. If you had laparoscopic surgery, you may also have pain in your shoulder for about 24 hours. You may have gas or need to burp a lot at first, and a few people get diarrhea. The diarrhea usually goes away in 2 to 4 weeks, but it may last longer. How quickly you recover depends on whether you had a laparoscopic or open surgery. · For a laparoscopic surgery, most people can go back to work or their normal routine in 1 to 2 weeks, but it may take longer, depending on the type of work you do. · For an open surgery, it will probably take 4 to 6 weeks before you get back to your normal routine. This care sheet gives you a general idea about how long it will take for you to recover. However, each person recovers at a different pace. Follow the steps below to get better as quickly as possible. How can you care for yourself at home? Activity    · Rest when you feel tired. Getting enough sleep will help you recover.     · Try to walk each day. Start out by walking a little more than you did the day before. Gradually increase the amount you walk. Walking boosts blood flow and helps prevent pneumonia and constipation.     · For about 2 to 4 weeks, avoid lifting anything that would make you strain. This may include a child, heavy grocery bags and milk containers, a heavy briefcase or backpack, cat litter or dog food bags, or a vacuum .     · Avoid strenuous activities, such as biking, jogging, weightlifting, and aerobic exercise, until your doctor says it is okay.     · You may shower 24 to 48 hours after surgery, if your doctor okays it. Pat the cut (incision) dry.  Do not take a bath for the first 2 weeks, or until your doctor tells you it is okay.     · You may drive when you are no longer taking pain medicine and can quickly move your foot from the gas pedal to the brake. You must also be able to sit comfortably for a long period of time, even if you do not plan to go far. You might get caught in traffic.     · For a laparoscopic surgery, most people can go back to work or their normal routine in 1 to 2 weeks, but it may take longer. For an open surgery, it will probably take 4 to 6 weeks before you get back to your normal routine.     · Your doctor will tell you when you can have sex again.    Diet    · Eat smaller meals more often instead of fewer larger meals. You can eat a normal diet, but avoid eating fatty foods for about 1 month. Fatty foods include hamburger, whole milk, cheese, and many snack foods. If your stomach is upset, try bland, low-fat foods like plain rice, broiled chicken, toast, and yogurt.     · Drink plenty of fluids (unless your doctor tells you not to).   · If you have diarrhea, try avoiding spicy foods, dairy products, fatty foods, and alcohol. You can also watch to see if specific foods cause it, and stop eating them. If the diarrhea continues for more than 2 weeks, talk to your doctor.     · You may notice that your bowel movements are not regular right after your surgery. This is common. Try to avoid constipation and straining with bowel movements. You may want to take a fiber supplement every day. If you have not had a bowel movement after a couple of days, ask your doctor about taking a mild laxative. Medicines    · Your doctor will tell you if and when you can restart your medicines. He or she will also give you instructions about taking any new medicines.     · If you take blood thinners, such as warfarin (Coumadin), clopidogrel (Plavix), or aspirin, be sure to talk to your doctor. He or she will tell you if and when to start taking those medicines again.  Make sure that you understand exactly what your doctor wants you to do.     · Take pain medicines exactly as directed. ? If the doctor gave you a prescription medicine for pain, take it as prescribed. ? If you are not taking a prescription pain medicine, take an over-the-counter medicine such as acetaminophen (Tylenol), ibuprofen (Advil, Motrin), or naproxen (Aleve). Read and follow all instructions on the label. ? Do not take two or more pain medicines at the same time unless the doctor told you to. Many pain medicines contain acetaminophen, which is Tylenol. Too much Tylenol can be harmful.     · If you think your pain medicine is making you sick to your stomach:  ? Take your medicine after meals (unless your doctor tells you not to). ? Ask your doctor for a different pain medicine.     · If your doctor prescribed antibiotics, take them as directed. Do not stop taking them just because you feel better. You need to take the full course of antibiotics. Incision care    · If you have strips of tape on the incision, or cut, leave the tape on for a week or until it falls off.     · After 24 to 48 hours, wash the area daily with warm, soapy water, and pat it dry.     · You may have staples to hold the cut together. Keep them dry until your doctor takes them out. This is usually in 7 to 10 days.     · Keep the area clean and dry. You may cover it with a gauze bandage if it weeps or rubs against clothing. Change the bandage every day.    Ice    · To reduce swelling and pain, put ice or a cold pack on your belly for 10 to 20 minutes at a time. Do this every 1 to 2 hours. Put a thin cloth between the ice and your skin. Follow-up care is a key part of your treatment and safety. Be sure to make and go to all appointments, and call your doctor if you are having problems. It's also a good idea to know your test results and keep a list of the medicines you take. When should you call for help? Call 911 anytime you think you may need emergency care.  For example, call if:    · You passed out (lost consciousness).     · You are short of breath. Christinia Feast your doctor now or seek immediate medical care if:    · You are sick to your stomach and cannot drink fluids.     · You have pain that does not get better when you take your pain medicine.     · You cannot pass stools or gas.     · You have signs of infection, such as:  ? Increased pain, swelling, warmth, or redness. ? Red streaks leading from the incision. ? Pus draining from the incision. ? A fever.     · Bright red blood has soaked through the bandage over your incision.     · You have loose stitches, or your incision comes open.     · You have signs of a blood clot in your leg (called a deep vein thrombosis), such as:  ? Pain in your calf, back of knee, thigh, or groin. ? Redness and swelling in your leg or groin.    Watch closely for any changes in your health, and be sure to contact your doctor if you have any problems. Where can you learn more? Go to http://golden-deana.info/. Enter 741 19 150 in the search box to learn more about \"Cholecystectomy: What to Expect at Home. \"  Current as of: March 27, 2018  Content Version: 11.9  © 9924-5947 [employer]. Care instructions adapted under license by [employer] (which disclaims liability or warranty for this information). If you have questions about a medical condition or this instruction, always ask your healthcare professional. [place] [address] any warranty or liability for your use of this information. Narcotic-Analgesic/Acetaminophen (Percocet, Norco, Lorcet HD, Lortab 10/325) - (By mouth)   Why this medicine is used:   Relieves pain.   Contact a nurse or doctor right away if you have:  · Extreme weakness, shallow breathing, slow heartbeat  · Severe confusion, lightheadedness, dizziness, fainting  · Yellow skin or eyes, dark urine or pale stools  · Severe constipation, severe stomach pain, nausea, vomiting, loss of appetite  · Sweating or cold, clammy skin     Common side effects:  · Mild constipation, nausea, vomiting  · Sleepiness, tiredness  · Itching, rash  © 2017 [facility] Information is for End User's use only and may not be sold, redistributed or otherwise used for commercial purposes. Promethazine (Phenergan, Promacot) - (By mouth)   Why this medicine is used:   Treats allergies and motion sickness. Also helps control nausea and vomiting. Contact a nurse or doctor right away if you have:  · Fast, pounding, or uneven heartbeat; lightheadedness or fainting  · Slow heartbeat, trouble breathing or speaking  · Extreme tiredness or weakness  · Fever, sweating, confusion, uneven heartbeat, muscle stiffness  · Twitching, muscle movements you cannot control     Common side effects:  · Drowsiness  · Nausea, vomiting, constipation, dry mouth  © 2017 [facility] Information is for End User's use only and may not be sold, redistributed or otherwise used for commercial purposes. DISCHARGE SUMMARY from Nurse    PATIENT INSTRUCTIONS:    After general anesthesia or intravenous sedation, for 24 hours or while taking prescription Narcotics:  · Limit your activities  · Do not drive and operate hazardous machinery  · Do not make important personal or business decisions  · Do  not drink alcoholic beverages  · If you have not urinated within 8 hours after discharge, please contact your surgeon on call. Report the following to your surgeon:  · Excessive pain, swelling, redness or odor of or around the surgical area  · Temperature over 100.5  · Nausea and vomiting lasting longer than 4 hours or if unable to take medications  · Any signs of decreased circulation or nerve impairment to extremity: change in color, persistent  numbness, tingling, coldness or increase pain  · Any questions    *  Please give a list of your current medications to your Primary Care Provider.     *  Please update this list whenever your medications are discontinued, doses are      changed, or new medications (including over-the-counter products) are added. *  Please carry medication information at all times in case of emergency situations. These are general instructions for a healthy lifestyle:    No smoking/ No tobacco products/ Avoid exposure to second hand smoke  Surgeon General's Warning:  Quitting smoking now greatly reduces serious risk to your health. Obesity, smoking, and sedentary lifestyle greatly increases your risk for illness    A healthy diet, regular physical exercise & weight monitoring are important for maintaining a healthy lifestyle    You may be retaining fluid if you have a history of heart failure or if you experience any of the following symptoms:  Weight gain of 3 pounds or more overnight or 5 pounds in a week, increased swelling in our hands or feet or shortness of breath while lying flat in bed. Please call your doctor as soon as you notice any of these symptoms; do not wait until your next office visit. Recognize signs and symptoms of STROKE:    F-face looks uneven    A-arms unable to move or move unevenly    S-speech slurred or non-existent    T-time-call 911 as soon as signs and symptoms begin-DO NOT go       Back to bed or wait to see if you get better-TIME IS BRAIN. Warning Signs of HEART ATTACK     Call 911 if you have these symptoms:   Chest discomfort. Most heart attacks involve discomfort in the center of the chest that lasts more than a few minutes, or that goes away and comes back. It can feel like uncomfortable pressure, squeezing, fullness, or pain.  Discomfort in other areas of the upper body. Symptoms can include pain or discomfort in one or both arms, the back, neck, jaw, or stomach.  Shortness of breath with or without chest discomfort.  Other signs may include breaking out in a cold sweat, nausea, or lightheadedness. Don't wait more than five minutes to call 911 - MINUTES MATTER!  Fast action can save your life. Calling 911 is almost always the fastest way to get lifesaving treatment. Emergency Medical Services staff can begin treatment when they arrive -- up to an hour sooner than if someone gets to the hospital by car. The discharge information has been reviewed with the patient and parent. The patient and parent verbalized understanding. Discharge medications reviewed with the patient and parent and appropriate educational materials and side effects teaching were provided.   ___________________________________________________________________________________________________________________________________

## 2019-03-21 NOTE — ROUTINE PROCESS
TRANSFER - OUT REPORT: 
 
Verbal report given to [facility] on Brian Garrett  being transferred to room 6 for routine progression of care Report consisted of patients Situation, Background, Assessment and  
Recommendations(SBAR). Information from the following report(s) OR Summary, Intake/Output and MAR was reviewed with the receiving nurse. Lines:  
Peripheral IV 03/21/19 Right Forearm (Active) Site Assessment Clean, dry, & intact 3/21/2019 11:51 AM  
Phlebitis Assessment 0 3/21/2019 11:51 AM  
Infiltration Assessment 0 3/21/2019 11:51 AM  
Dressing Status Clean, dry, & intact 3/21/2019 11:51 AM  
Dressing Type Tape;Transparent 3/21/2019 11:51 AM  
Hub Color/Line Status Pink; Infusing 3/21/2019 11:51 AM  
   
Peripheral IV 03/21/19 Left Forearm (Active) Site Assessment Clean, dry, & intact 3/21/2019  2:21 PM  
Phlebitis Assessment 0 3/21/2019  2:21 PM  
Infiltration Assessment 0 3/21/2019  2:21 PM  
Dressing Status Clean, dry, & intact 3/21/2019  2:21 PM  
Dressing Type Tape;Transparent 3/21/2019  2:21 PM  
Hub Color/Line Status Pink; Infusing 3/21/2019  2:21 PM  
  
 
Opportunity for questions and clarification was provided. Patient transported with:tech

## 2019-03-21 NOTE — PERIOP NOTES
Patient confirmed by two identifiers with discharge instructions prior too being provided to patient and mother.

## 2019-03-21 NOTE — DISCHARGE SUMMARY
4 Lev Horton MD, [facility]  General Surgery  Discharge Summary     Patient ID:  Satnam Howe  [id number]  3 y.o.  2000    Admit Date: 3/21/2019    Discharge Date: 3/21/2019    Admission Diagnoses: Gallstones [K80.20]    Discharge Diagnoses:    Problem List as of 3/21/2019 Date Reviewed: 3/12/2019          Codes Class Noted - Resolved    Gallstones ICD-10-CM: K80.20  ICD-9-CM: 574.20  3/21/2019 - Present               Admission Condition: Good    Discharge Condition: Good    Last Procedure: Procedure(s):  ROBOTIC ASSISTED SINGLE SITE LAPAROSCOPIC CHOLECYSTECTOMY WITH [facility] [place], operated by [facility] Course:   Normal hospital course for this procedure. Consults: None    Significant Diagnostic Studies: None    Disposition: home    Patient Instructions:   Current Discharge Medication List      START taking these medications    Details   oxyCODONE-acetaminophen (PERCOCET) 5-325 mg per tablet Take 1 Tab by mouth every six (6) hours as needed for Pain for up to 7 days. Max Daily Amount: 4 Tabs. Qty: 20 Tab, Refills: 0    Associated Diagnoses: Postoperative pain           Activity: See surgical instructions  Diet: Low fat, Low cholesterol  Wound Care: As directed    Follow-up with Dr. Simone Narayanan in 2 weeks.   Follow-up tests/labs None    Signed:  Cecilia Duvall MD  3/21/2019  11:40 AM

## 2019-03-21 NOTE — ANESTHESIA PREPROCEDURE EVALUATION
Relevant Problems No relevant active problems Anesthetic History Comments: No prior anesthesia  No family hx of family problems Review of Systems / Medical History Patient summary reviewed and pertinent labs reviewed Pulmonary Neuro/Psych Cardiovascular GI/Hepatic/Renal 
  
 
 
 
 
 
 Endo/Other Anemia Other Findings Comments: herediary spherocytosis Physical Exam 
 
Airway Mallampati: II 
TM Distance: 4 - 6 cm Neck ROM: normal range of motion Mouth opening: Normal 
 
 Cardiovascular Rhythm: regular Rate: normal 
 
 
 
 Dental 
No notable dental hx Pulmonary Breath sounds clear to auscultation Abdominal 
GI exam deferred Other Findings Anesthetic Plan ASA: 2 Anesthesia type: general 
 
 
 
 
Induction: Intravenous Anesthetic plan and risks discussed with: Patient

## 2019-04-12 ENCOUNTER — OFFICE VISIT (OUTPATIENT)
Dept: SURGERY | Age: 19
End: 2019-04-12

## 2019-04-12 VITALS
BODY MASS INDEX: 17.2 KG/M2 | HEART RATE: 94 BPM | OXYGEN SATURATION: 98 % | HEIGHT: 74 IN | WEIGHT: 134 LBS | DIASTOLIC BLOOD PRESSURE: 76 MMHG | SYSTOLIC BLOOD PRESSURE: 123 MMHG | TEMPERATURE: 97.6 F | RESPIRATION RATE: 18 BRPM

## 2019-04-12 DIAGNOSIS — Z09 POSTOPERATIVE EXAMINATION: Primary | ICD-10-CM

## 2019-04-12 NOTE — PROGRESS NOTES
Kwame Sarmiento. TOM Martinez  PROGRESS NOTE    Name: Miki Mack MRN: [id number]   : 2000 Hospital: [facility]   Date: 2019 Admission Date: No admission date for patient encounter. Subjective:  Mr. Dayna Velasco is a very pleasant 21 yo male who presents today with his mother for a 3 week post op evaluation following robot assisted, single site, laparoscopic, cholecystectomy with firefly done 2019. He is feeling well and denies pain or complaints. He denies fever, chills, or drainage from his incision. He is a full time student and is able to comply with activity restrictions. He is still eating low fat foods. We discussed activity restrictions with rationale and the need to trial and error foods at this point. No swimming for a month after surgery. Objective:  Vitals:    19 1328   BP: 123/76   Pulse: 94   Resp: 18   Temp: 97.6 °F (36.4 °C)   TempSrc: Oral   SpO2: 98%   Weight: 60.8 kg (134 lb)   Height: 6' 2\" (1.88 m)        Physical Exam:   Physical Exam   Constitutional: He is oriented to person, place, and time and well-developed, well-nourished, and in no distress. HENT:   Head: Normocephalic and atraumatic. Neck: Normal range of motion. Neck supple. Cardiovascular: Normal rate and regular rhythm. Pulmonary/Chest: Effort normal and breath sounds normal. No respiratory distress. Abdominal: Soft. He exhibits no distension and no mass. There is no tenderness. There is no rebound and no guarding. Umbilical incision is C/D/I. No edema, no erythema, no drainage, no seroma. Small scab to superior and central incision. Musculoskeletal: Normal range of motion. Neurological: He is alert and oriented to person, place, and time. Gait normal.   Skin: Skin is warm and dry. No rash noted. No erythema. Psychiatric: Mood, memory, affect and judgment normal.       Labs:  No results found for this or any previous visit (from the past 24 hour(s)).     Current Medications:  Current Outpatient Medications   Medication Sig Dispense Refill    promethazine (PHENERGAN) 25 mg tablet Take 1 Tab by mouth every six (6) hours as needed for Nausea. 10 Tab 0       Chart and notes reviewed. Data reviewed. I have evaluated and examined the patient. IMPRESSION:   · Patient doing well 3 weeks out from  robot assisted, single sit., laparoscopic, cholecystectomy. PLAN:/DISCUSION:   · Continue activity restrictions for another 3 weeks  · No swimming for at least another week or until scabs fall off and skin is healed completely  · Trial and error foods   · Follow up as needed  · Please call with any questions or concerns prior to next visit     Mr. Darby Judge has a reminder for a \"due or due soon\" health maintenance. I have asked that he contact his primary care provider for follow-up on this health maintenance. Milton Corrigan.  Kemar Harris, FNP-C

## 2019-04-12 NOTE — PATIENT INSTRUCTIONS
Learning About Cholecystectomy  What is cholecystectomy  Cholecystectomy (say \"koh-zaida-sis-ARTHUR-tuh-alea\") is surgery to remove the gallbladder and gallstones. The gallbladder stores bile made by your liver. The bile helps you digest fats. Gallstones are made of cholesterol and other things found in bile. Your body will work fine without a gallbladder. Bile will go straight from the liver to the intestine. There may be small changes in how you digest food. But you probably will not notice them. How is the surgery done? Cholecystectomy is usually laparoscopic surgery. To do this type of surgery, a doctor puts a lighted tube, or scope, and other surgical tools through small cuts (incisions) in your belly. The doctor is able to see your organs with the scope. After your gallbladder is removed, you will no longer have gallstones. The cuts leave scars that usually fade with time. Open surgery may be done if problems are found during laparoscopic surgery. With open surgery, the gallbladder is removed through one larger cut in your belly. And the hospital stay is longer. What can you expect after surgery? It is normal to feel weak and tired for several days after you return home. Your belly may be swollen. If you had laparoscopic surgery, you may also have pain in your shoulder for about 24 hours. You may have gas or need to burp a lot at first.  A few people get diarrhea. The diarrhea usually goes away in 2 to 4 weeks. But it may last longer. How quickly you get better depends on which kind of surgery you had. For laparoscopic surgery, most people can go back to work or their normal routine in 1 to 2 weeks. It depends on the type of work you do and how you feel. If you have open surgery, it will probably take 4 to 6 weeks before you get back to your normal routine. Follow-up care is a key part of your treatment and safety.  Be sure to make and go to all appointments, and call your doctor if you are having problems. It's also a good idea to know your test results and keep a list of the medicines you take. Where can you learn more? Go to http://golden-deana.info/. Enter C713 in the search box to learn more about \"Learning About Cholecystectomy. \"  Current as of: March 27, 2018  Content Version: 11.9  © 5334-9713 [email or web address]. Care instructions adapted under license by [employer] (which disclaims liability or warranty for this information). If you have questions about a medical condition or this instruction, always ask your healthcare professional. [place] [address] any warranty or liability for your use of this information.

## 2019-04-12 NOTE — PROGRESS NOTES
Chief Complaint   Patient presents with    Post OP Follow Up     Lap Cholecystectomy 3/21/2019       Pt states  where incision is near belly button. 1. Have you been to the ER, urgent care clinic since your last visit? Hospitalized since your last visit? No    2. Have you seen or consulted any other health care providers outside of the [address] [place] [address] since your last visit? Include any pap smears or colon screening.  No

## (undated) DEVICE — COLUMN DRAPE

## (undated) DEVICE — DRAPE TOWEL: Brand: CONVERTORS

## (undated) DEVICE — GAUZE,SPONGE,4"X4",12PLY,STERILE,LF,2'S: Brand: MEDLINE

## (undated) DEVICE — SYR 10ML LUER LOK 1/5ML GRAD --

## (undated) DEVICE — PREP SKN CHLRAPRP 26ML TNT -- CONVERT TO ITEM 373320

## (undated) DEVICE — INTENDED FOR TISSUE SEPARATION, AND OTHER PROCEDURES THAT REQUIRE A SHARP SURGICAL BLADE TO PUNCTURE OR CUT.: Brand: BARD-PARKER ®  SAFETY SCALPED

## (undated) DEVICE — SUTURE MCRYL SZ 4-0 L27IN ABSRB UD L24MM PS-1 3/8 CIR PRIM Y935H

## (undated) DEVICE — STERILE POLYISOPRENE POWDER-FREE SURGICAL GLOVES: Brand: PROTEXIS

## (undated) DEVICE — SEAL UNIV 5-8MM DISP BX/10 -- DA VINCI XI - SNGL USE

## (undated) DEVICE — SUTURE VCRL SZ 0 L18IN ABSRB UD POLYGLACTIN 910 BRAID TIE J912G

## (undated) DEVICE — Device

## (undated) DEVICE — CANNULA SEAL: Brand: SINGLE-SITE

## (undated) DEVICE — CARTRIDGE CLP LIG HEMLOK GRN --

## (undated) DEVICE — REM POLYHESIVE ADULT PATIENT RETURN ELECTRODE: Brand: VALLEYLAB

## (undated) DEVICE — ARM DRAPE

## (undated) DEVICE — FLEX ADVANTAGE 3000CC: Brand: FLEX ADVANTAGE

## (undated) DEVICE — SOLUTION IV 1000ML 0.9% SOD CHL

## (undated) DEVICE — SPONGE HEMOSTAT CELLULS 4X8IN -- SURGICEL

## (undated) DEVICE — NEEDLE HYPO 25GA L1.5IN BVL ORIENTED ECLIPSE

## (undated) DEVICE — TIP COVER ACCESSORY

## (undated) DEVICE — MAYO STAND COVER: Brand: CONVERTORS

## (undated) DEVICE — COVER LT HNDL BLU STRL -- MEDICHOICE

## (undated) DEVICE — ENDOSCOPE PORT: Brand: SINGLE-SITE

## (undated) DEVICE — ELECTRO LUBE IS A SINGLE PATIENT USE DEVICE THAT IS INTENDED TO BE USED ON ELECTROSURGICAL ELECTRODES TO REDUCE STICKING.: Brand: KEY SURGICAL ELECTRO LUBE

## (undated) DEVICE — SUTURE PDS II SZ 1 L36IN ABSRB VLT L36MM CT-1 1/2 CIR Z347H

## (undated) DEVICE — SUTURE VCRL SZ 3-0 L27IN ABSRB UD L26MM SH 1/2 CIR J416H

## (undated) DEVICE — GLOVE SURG BIOGEL 8.0 STRL -- SKINSENSE

## (undated) DEVICE — 3M™ TEGADERM™ TRANSPARENT FILM DRESSING FRAME STYLE, 1626W, 4 IN X 4-3/4 IN (10 CM X 12 CM), 50/CT 4CT/CASE: Brand: 3M™ TEGADERM™

## (undated) DEVICE — INTENDED FOR TISSUE SEPARATION, AND OTHER PROCEDURES THAT REQUIRE A SHARP SURGICAL BLADE TO PUNCTURE OR CUT.: Brand: BARD-PARKER SAFETY BLADES SIZE 11, STERILE

## (undated) DEVICE — 3M™ BAIR PAWS FLEX™ WARMING GOWN, STANDARD, 20 PER CASE 81003: Brand: BAIR PAWS™

## (undated) DEVICE — AIRLIFE™ NASAL OXYGEN CANNULA CURVED, NONFLARED TIP WITH 14 FOOT (4.3 M) CRUSH-RESISTANT TUBING, OVER-THE-EAR STYLE: Brand: AIRLIFE™

## (undated) DEVICE — KIT CLN UP BON SECOURS MARYV

## (undated) DEVICE — SMOKE EVACUATION PENCIL: Brand: VALLEYLAB